# Patient Record
Sex: FEMALE | Race: WHITE | NOT HISPANIC OR LATINO | Employment: PART TIME | URBAN - METROPOLITAN AREA
[De-identification: names, ages, dates, MRNs, and addresses within clinical notes are randomized per-mention and may not be internally consistent; named-entity substitution may affect disease eponyms.]

---

## 2021-05-27 ENCOUNTER — RECORDS - HEALTHEAST (OUTPATIENT)
Dept: ADMINISTRATIVE | Facility: CLINIC | Age: 36
End: 2021-05-27

## 2023-11-17 ENCOUNTER — OFFICE VISIT (OUTPATIENT)
Dept: FAMILY MEDICINE | Facility: CLINIC | Age: 38
End: 2023-11-17

## 2023-11-17 VITALS
TEMPERATURE: 99.8 F | OXYGEN SATURATION: 98 % | HEART RATE: 106 BPM | DIASTOLIC BLOOD PRESSURE: 72 MMHG | SYSTOLIC BLOOD PRESSURE: 122 MMHG

## 2023-11-17 DIAGNOSIS — M54.50 ACUTE BILATERAL LOW BACK PAIN WITHOUT SCIATICA: Primary | ICD-10-CM

## 2023-11-17 PROCEDURE — 99203 OFFICE O/P NEW LOW 30 MIN: CPT | Performed by: STUDENT IN AN ORGANIZED HEALTH CARE EDUCATION/TRAINING PROGRAM

## 2023-11-17 RX ORDER — CYCLOBENZAPRINE HCL 5 MG
5 TABLET ORAL 3 TIMES DAILY PRN
Qty: 10 TABLET | Refills: 0 | Status: SHIPPED | OUTPATIENT
Start: 2023-11-17

## 2023-11-17 ASSESSMENT — PAIN SCALES - GENERAL: PAINLEVEL: MILD PAIN (3)

## 2023-11-17 NOTE — PROGRESS NOTES
Assessment & Plan     Acute bilateral low back pain without sciatica  Will treat patient at this time symptomatically and supportively, this will include encouraging: using NSAIDs/Tylenol, muscle relaxants to help decrease pain and inflammation, resting, applying ice as needed. Further encouraged creams and rubs such as lidocaine or Voltaren   We discussed that acute back pain from a strain or sprain usually gets better in 1-2 weeks but back pain from disc disease, arthritis or spinal canal narrowing can last much longer, for this reason I encouraged the patient to  follow-up with their PCP or spine speciality for further evaluation and treatment if no improvement in the next 2-3 weeks. However if symptoms worsen they should follow up immediately; educated them to monitor for worsening symptoms such as: new weakness, numbness or tingling; new fever/chills, loss of strength in legs; or loss of bowel or bladder function.   - Physical Therapy Referral  - cyclobenzaprine (FLEXERIL) 5 MG tablet  Dispense: 10 tablet; Refill: 0     17 minutes spent by me on the date of the encounter doing chart review, history and exam, documentation and further activities per the note. Billed based on complexity of care.     No follow-ups on file.    Mone Mcleod MD  [facility] SANDRA Ponce is a 38 year old female who presents to clinic today for the following health issues:  Chief Complaint   Patient presents with    Urgent Care     Lower back pain x 1 day. Pt is taking tylenol for pain reliever. No known injury.     HPI    Has had the same thing happen in the past. Feels like her back is going out. Was picking up a package off the floor that was heavier than she expected and it hurts her back. Throughout the day, getting up, can feel it when she walks, can't lift or puts stuff over her head.     Back Pain    Onset of symptoms was 1 day(s) ago.  Location: bilateral low back  Radiation: does  not radiate  Context:       The injury happened while at work      Mechanism: recent heavy lifting      Patient experienced immediate pain  Course of symptoms is waxing and waning.    Severity moderate  Current and Associated symptoms: pain  Denies: fecal incontinence, urinary incontinence, lower extremity numbness, lower extremity weakness, and paresthesia    Aggravating Factors: lifting, walking, sitting, and changing position  Therapies to improve symptoms include: Tylenol, rest  Past history: recurrent self limited episodes of low back pain in the past    Review of Systems  Constitutional, HEENT, cardiovascular, pulmonary, gi and gu systems are negative, except as otherwise noted.      Objective    /72 (BP Location: Left arm, Patient Position: Sitting)   Pulse 106   Temp 99.8  F (37.7  C) (Tympanic)   SpO2 98%   Physical Exam   GENERAL: healthy, alert and no distress  NECK: no adenopathy, no asymmetry, masses, or scars and thyroid normal to palpation  RESP: lungs clear to auscultation - no rales, rhonchi or wheezes  CV: regular rate and rhythm, normal S1 S2, no S3 or S4, no murmur, click or rub, no peripheral edema and peripheral pulses strong  MS: no gross musculoskeletal defects noted, no edema  SKIN: no suspicious lesions or rashes  Comprehensive back pain exam:  Tenderness of left low back L1-L5, right low back L2-L4, Pain limits the following motions: flexion and extension, rotation, Lower extremity strength functional and equal on both sides, Lower extremity reflexes within normal limits bilaterally, and Straight leg positive on  left, indicating possible ipsilateral radiculopathy

## 2023-11-17 NOTE — LETTER
November 17, 2023      Rosario Sánchez  [address] [place] [address]        To Whom It May Concern:    Rosario Sánchez was seen in our clinic. She may return to work with the following: limited to light duty - lifting no greater than 20 pounds and no bending/stooping, may not operate heavy equipment , and must be able to take a break for 5 mins every 1-2 hrs to move position.      Sincerely,        Mone Mcleod MD

## 2023-11-20 ENCOUNTER — TELEPHONE (OUTPATIENT)
Dept: FAMILY MEDICINE | Facility: CLINIC | Age: 38
End: 2023-11-20

## 2023-11-21 ENCOUNTER — TELEPHONE (OUTPATIENT)
Dept: FAMILY MEDICINE | Facility: CLINIC | Age: 38
End: 2023-11-21

## 2023-11-21 NOTE — TELEPHONE ENCOUNTER
Please call patient and let her know she can schedule an online visit to discuss her symptoms with a provider and get her note updated.  We can't do that w/o an evaluation.

## 2023-11-21 NOTE — TELEPHONE ENCOUNTER
Call and left patient a message on clarity on reason to why she would need new letter without the restriction or if she is needing something different on the letter. Callback number provided.    Ana Christy on 11/21/2023 at 5:26 PM

## 2023-11-21 NOTE — TELEPHONE ENCOUNTER
FYI - Status Update    Who is Calling: patient    Update: She is looking for a not saying that she has no restrictions. That they are lifted.     Does caller want a call/response back: Yes     Okay to leave a detailed message?: Yes at Cell number on file:    Telephone Information:   Mobile [phone]

## 2023-11-22 ENCOUNTER — VIRTUAL VISIT (OUTPATIENT)
Dept: FAMILY MEDICINE | Facility: CLINIC | Age: 38
End: 2023-11-22

## 2023-11-22 DIAGNOSIS — Z53.9 NO SHOW: Primary | ICD-10-CM

## 2023-11-22 PROCEDURE — 99207 PR NO CHARGE NURSE ONLY: CPT | Mod: VID | Performed by: FAMILY MEDICINE

## 2023-11-22 NOTE — PROGRESS NOTES
"Rosario is a 38 year old who is being evaluated via a billable video visit.      How would you like to obtain your AVS? MyChart  If the video visit is dropped, the invitation should be resent by: Text to cell phone: [phone]  Will anyone else be joining your video visit? No  {If patient encounters technical issues they should call [phone] :774291}        {PROVIDER CHARTING PREFERENCE:701271}    Subjective   Rosario is a 38 year old, presenting for the following health issues:  No chief complaint on file.  {(!) Visit Details have not yet been documented.  Please enter Visit Details and then use this list to pull in documentation. (Optional):889077}    HPI     {SUPERLIST (Optional):167570}  {additonal problems for provider to add (Optional):303987}      Review of Systems   {ROS COMP (Optional):794427}      Objective           Vitals:  No vitals were obtained today due to virtual visit.    Physical Exam   {video visit exam brief selected:203327}    {Diagnostic Test Results (Optional):344590}    {AMBULATORY ATTESTATION (Optional):688708}        Video-Visit Details    Type of service:  Video Visit   Video Start Time: {video visit start/end time for provider to select:120856}  Video End Time:{video visit start/end time for provider to select:187224}    Originating Location (pt. Location): {video visit patient location:443067::\"Home\"}  {PROVIDER LOCATION On-site should be selected for visits conducted from your clinic location or adjoining [facility] hospital, academic office, or other nearby [facility] building. Off-site should be selected for all other provider locations, including home:13[address]}  Distant Location (provider location):  {virtual location provider:277384}  Platform used for Video Visit: {Virtual Visit Platforms:904019::\"[employer]\"}      "

## 2023-11-22 NOTE — TELEPHONE ENCOUNTER
Patient has appt tomorrow to see provider regarding lifting restrictions. No other concerns.    Ana Christy on 11/21/2023 at 8:05 PM